# Patient Record
Sex: MALE | Race: WHITE
[De-identification: names, ages, dates, MRNs, and addresses within clinical notes are randomized per-mention and may not be internally consistent; named-entity substitution may affect disease eponyms.]

---

## 2017-11-17 PROBLEM — Z00.00 ENCOUNTER FOR PREVENTIVE HEALTH EXAMINATION: Status: ACTIVE | Noted: 2017-11-17

## 2017-11-30 ENCOUNTER — APPOINTMENT (OUTPATIENT)
Dept: UROLOGY | Facility: CLINIC | Age: 37
End: 2017-11-30
Payer: COMMERCIAL

## 2017-11-30 VITALS
TEMPERATURE: 98.7 F | HEIGHT: 74 IN | DIASTOLIC BLOOD PRESSURE: 107 MMHG | WEIGHT: 290 LBS | SYSTOLIC BLOOD PRESSURE: 145 MMHG | HEART RATE: 99 BPM | BODY MASS INDEX: 37.22 KG/M2

## 2017-11-30 DIAGNOSIS — Z80.8 FAMILY HISTORY OF MALIGNANT NEOPLASM OF OTHER ORGANS OR SYSTEMS: ICD-10-CM

## 2017-11-30 DIAGNOSIS — Z78.9 OTHER SPECIFIED HEALTH STATUS: ICD-10-CM

## 2017-11-30 DIAGNOSIS — Z80.42 FAMILY HISTORY OF MALIGNANT NEOPLASM OF PROSTATE: ICD-10-CM

## 2017-11-30 PROCEDURE — 76870 US EXAM SCROTUM: CPT

## 2017-11-30 PROCEDURE — 99204 OFFICE O/P NEW MOD 45 MIN: CPT | Mod: 25

## 2017-12-04 LAB
ESTRADIOL SERPL-MCNC: <5 PG/ML
FSH SERPL-MCNC: 9 IU/L
LH SERPL-ACNC: 6.8 IU/L
TESTOST SERPL-MCNC: 246.8 NG/DL

## 2017-12-05 ENCOUNTER — MESSAGE (OUTPATIENT)
Age: 37
End: 2017-12-05

## 2017-12-06 LAB — ESTROGEN SERPL-MCNC: 43 PG/ML

## 2017-12-13 ENCOUNTER — MESSAGE (OUTPATIENT)
Age: 37
End: 2017-12-13

## 2018-01-29 ENCOUNTER — APPOINTMENT (OUTPATIENT)
Dept: HUMAN REPRODUCTION | Facility: CLINIC | Age: 38
End: 2018-01-29
Payer: COMMERCIAL

## 2018-01-29 PROCEDURE — 89322 SEMEN ANAL STRICT CRITERIA: CPT

## 2018-02-14 ENCOUNTER — APPOINTMENT (OUTPATIENT)
Dept: UROLOGY | Facility: CLINIC | Age: 38
End: 2018-02-14
Payer: COMMERCIAL

## 2018-02-14 DIAGNOSIS — I86.1 SCROTAL VARICES: ICD-10-CM

## 2018-02-14 DIAGNOSIS — N46.9 MALE INFERTILITY, UNSPECIFIED: ICD-10-CM

## 2018-02-14 LAB
ESTRADIOL SERPL-MCNC: 13 PG/ML
PROLACTIN SERPL-MCNC: 10 NG/ML

## 2018-02-14 PROCEDURE — 99214 OFFICE O/P EST MOD 30 MIN: CPT

## 2018-02-15 ENCOUNTER — LABORATORY RESULT (OUTPATIENT)
Age: 38
End: 2018-02-15

## 2018-02-20 LAB
TESTOST BND SERPL-MCNC: 10 PG/ML
TESTOST SERPL-MCNC: 265.4 NG/DL

## 2018-02-21 LAB — Y CHROMOSOME MICRODELETION, DNA ANALYSIS: NORMAL

## 2018-03-15 ENCOUNTER — APPOINTMENT (OUTPATIENT)
Dept: HUMAN REPRODUCTION | Facility: CLINIC | Age: 38
End: 2018-03-15
Payer: COMMERCIAL

## 2018-03-15 PROCEDURE — 89343 STORAGE/YEAR SPERM/SEMEN: CPT

## 2018-03-15 PROCEDURE — 89322 SEMEN ANAL STRICT CRITERIA: CPT

## 2018-03-15 PROCEDURE — 89259 CRYOPRESERVATION SPERM: CPT

## 2020-07-19 ENCOUNTER — TRANSCRIPTION ENCOUNTER (OUTPATIENT)
Age: 40
End: 2020-07-19

## 2024-04-24 ENCOUNTER — NON-APPOINTMENT (OUTPATIENT)
Age: 44
End: 2024-04-24

## 2024-04-24 ENCOUNTER — APPOINTMENT (OUTPATIENT)
Dept: ORTHOPEDIC SURGERY | Facility: CLINIC | Age: 44
End: 2024-04-24
Payer: COMMERCIAL

## 2024-04-24 VITALS — HEIGHT: 74 IN | BODY MASS INDEX: 34.65 KG/M2 | WEIGHT: 270 LBS

## 2024-04-24 DIAGNOSIS — M19.072 PRIMARY OSTEOARTHRITIS, LEFT ANKLE AND FOOT: ICD-10-CM

## 2024-04-24 DIAGNOSIS — M25.872 OTHER SPECIFIED JOINT DISORDERS, LEFT ANKLE AND FOOT: ICD-10-CM

## 2024-04-24 DIAGNOSIS — M25.572 PAIN IN RIGHT ANKLE AND JOINTS OF RIGHT FOOT: ICD-10-CM

## 2024-04-24 DIAGNOSIS — M19.071 PRIMARY OSTEOARTHRITIS, RIGHT ANKLE AND FOOT: ICD-10-CM

## 2024-04-24 DIAGNOSIS — M25.571 PAIN IN RIGHT ANKLE AND JOINTS OF RIGHT FOOT: ICD-10-CM

## 2024-04-24 DIAGNOSIS — M25.871 OTHER SPECIFIED JOINT DISORDERS, RIGHT ANKLE AND FOOT: ICD-10-CM

## 2024-04-24 PROCEDURE — 73610 X-RAY EXAM OF ANKLE: CPT | Mod: 50

## 2024-04-24 PROCEDURE — 99204 OFFICE O/P NEW MOD 45 MIN: CPT

## 2024-04-24 NOTE — ADDENDUM
[FreeTextEntry1] : I, Deangelo Gutierrez, acted solely as a scribe for Dr. Deangelo Lynn on this date 04/24/2024  .   All medical record entries made by the Scribe were at my, Dr. Deangelo Lynn, direction and personally dictated by me on 04/24/2024 . I have reviewed the chart and agree that the record accurately reflects my personal performance of the history, physical exam, assessment and plan. I have also personally directed, reviewed, and agreed with the chart.

## 2024-04-24 NOTE — HISTORY OF PRESENT ILLNESS
[FreeTextEntry1] : The patient is a 44 year old male presenting for an initial visit of bilateral ankle pain. He has had pain in both his ankles from playing sports when he was around the age of 21. In the past 3 years, about every 3 months, his foot his swollen in the morning.  His left ankle is affected more than his right in regards to the swelling. He is not certain what the cause of the swelling is but he believes that it could be related to physical activity. He has had no recent trauma to the area.  The patient presents to the office in sneakers and ambulating without assistance.

## 2024-04-24 NOTE — DISCUSSION/SUMMARY
[de-identified] :  Today I had a lengthy discussion with the patient regarding their bilateral pain. I have addressed all the patient's concerns surrounding the pathology of their condition.  I have reviewed the patient's XR imaging with them in great detail.  I recommend the patient undergo a course of physical therapy for the both ankles  2-3 times a week for a total of 8-12 weeks. A prescription was given for the physical therapy today. I recommend that the patient utilize ice, NSAIDS PRN, and heat. They can also elevate their L and R ankles above the level of the heart. I recommend that the patient utilize an over the counter ankle sleeve.  The patient understood and verbally agreed to the treatment plan. All of their questions were answered and they were satisfied with the visit. The patient should call the office if they have any questions or experience worsening symptoms.  FU in 6-8 weeks.

## 2024-04-24 NOTE — PHYSICAL EXAM
[de-identified] : Bilateral ankle Physical Examination:  General: Alert and oriented x3.  In no acute distress.  Pleasant in nature with a normal affect.  No apparent respiratory distress.  Erythema, Warmth, Rubor: Negative Swelling: positive  + telangiectasia on the left ankle  ROM: 1. Dorsiflexion: 10 degrees 2. Plantarflexion: 40 degrees 3. Inversion: 30 degrees 4. Eversion: 20 degrees  Tenderness to Palpation:  1. Lateral Malleolus: Negative 2. Medial Malleolus: Negative 3. Proximal Fibular Pain: Negative 4. Heel Pain: Negative 5. Cuboid: Negative 6. Navicular: Negative 7. Tibiotalar Joint: Negative 8. Subtalar Joint: Negative 9. Posterior Recess: Negative  Tendon Pain: 1. Achilles: Negative 2. Peroneals: Negative 3. Posterior Tibialis: Negative 4. Tibialis Anterior: Negative  Ligament Pain: 1. ATFL: Negative 2. CFL: Negative  3. PTFL: Negative 4. Deltoid Ligaments: Negative 5. Lis Franc Ligament: Negative  Stability:  1. Anterior Drawer: Negative 2. Posterior Drawer: Negative  Strength: 5/5 TA/GS/EHL  Pulses: 2+ DP/PT Pulses  Neuro: Intact motor and sensory  Additional Test: 1. Calcaneal Squeeze Test: Negative 2. Syndesmosis Squeeze Test: Negative [de-identified] : 6V of the bilateral ankles were ordered obtained and reviewed by me today, 04/24/2024 , revealed: no fracture. Ankle arthritis and ankle impingement visible, L>R. Anterior beaking on the distal tibia on the left side with osteophytes on the talar dome.

## 2024-07-10 ENCOUNTER — NON-APPOINTMENT (OUTPATIENT)
Age: 44
End: 2024-07-10

## 2024-11-05 ENCOUNTER — NON-APPOINTMENT (OUTPATIENT)
Age: 44
End: 2024-11-05